# Patient Record
Sex: MALE | Race: WHITE
[De-identification: names, ages, dates, MRNs, and addresses within clinical notes are randomized per-mention and may not be internally consistent; named-entity substitution may affect disease eponyms.]

---

## 2018-07-30 ENCOUNTER — HOSPITAL ENCOUNTER (EMERGENCY)
Dept: HOSPITAL 68 - ERH | Age: 48
End: 2018-07-30
Payer: COMMERCIAL

## 2018-07-30 VITALS — BODY MASS INDEX: 22.73 KG/M2 | WEIGHT: 150 LBS | HEIGHT: 68 IN

## 2018-07-30 VITALS — SYSTOLIC BLOOD PRESSURE: 132 MMHG | DIASTOLIC BLOOD PRESSURE: 79 MMHG

## 2018-07-30 DIAGNOSIS — S46.911A: Primary | ICD-10-CM
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
## 2018-07-30 NOTE — RADIOLOGY REPORT
EXAMINATION:
XR SHOULDER, RIGHT
 
CLINICAL INFORMATION:
Right shoulder pain.
 
COMPARISON:
None
 
TECHNIQUE:
Right shoulder, 3 views
 
FINDINGS:
Alignment is normal. No evidence of inflammatory or degenerative arthropathy
at the glenohumeral or acromioclavicular joints. The humeral head is
well-positioned over the intact glenoid. The acromiohumeral and
coracoclavicular distances are normal. No soft tissue calcification or mass
is seen. The visualized right lung is clear.
 
IMPRESSION:
Normal right shoulder.

## 2024-05-13 NOTE — ED UPPER/LOWER EXTREMITY COMPL
History of Present Illness
 
General
Chief Complaint: Neck/Upper Back Pain/Injury
Stated Complaint: "I FELT SOMETHING POP IN MY BACK LAST NIGHT PAIN"
Source: patient
Exam Limitations: no limitations
 
Vital Signs & Intake/Output
Vital Signs & Intake/Output
 Vital Signs
 
 
Date Time Temp Pulse Resp B/P B/P Pulse O2 O2 Flow FiO2
 
     Mean Ox Delivery Rate 
 
 1336 98.4 88 18 132/79  99   
 
 1136 96.2 65 20 137/71  94 Room Air  
 
 
 
Allergies
Coded Allergies:
NO KNOWN ALLERGIES (12)
 
Reconcile Medications
AMOXICILLIN/POTASSIUM CLAV (Augmentin 875-125 Tablet) 875 MG/125 MG TAB   1 TAB 
PO BID DOG BITE
Cyclobenzaprine HCl 10 MG TABLET   1 TAB PO TID SPASMS
Ibuprofen 800 MG TABLET   1 TAB PO TID PAIN
Naproxen (EC-Naprosyn) 500 MG TABLET.DR   500 MG PO BID PRN back pain
Orphenadrine Citrate (Norflex) 100 MG TER   100 MG PO BID PRN PAIN MODERATE TO 
SEVERE
 
Triage Note:
STATES HE TURNED THE WRONG WAY LAST NIGHT AND FELT
 SOMETHING POP IN HIS RIGHT SHOULDER BLADE AREA.
 DENIES NECK PAIN
Triage Nurses Notes Reviewed? yes
Onset: Abrupt
Duration: day(s):
Timing: recent history
Severity: moderate, severe
Pain/Injury Location:
Right: Shoulder. 
Method of Injury: unknown
HPI:
48-year-old male comes into the emergency room for further evaluation of right 
shoulder pain.  Patient reports that he felt something pop in his right shoulder
when he was in the kitchen the other day and was turning.  He's had pain since 
then with certain movements.  Denies any falls.  Denies any numbness or 
tingling.  He comes in for further evaluation.  He admits to being on Suboxone 
daily for previous opioid addiction.
(Jordan Sorto)
 
Past History
 
Travel History
Traveled to [place] past 21 day No
 
Medical History
Any Pertinent Medical History? see below for history
Neurological: NONE
EENT: NONE
Cardiovascular: NONE
Respiratory: NONE
Gastrointestinal: NONE
Hepatic: NONE
Renal: NONE
Musculoskeletal: NONE
Psychiatric: opioid dependence
Endocrine: NONE
Blood Disorders: NONE
Cancer(s): NONE
GYN/Reproductive: NONE
Tetanus Vaccine: 14
 
Surgical History
Surgical History: N
 
Psychosocial History
What is your primary language English
Tobacco Use: Current Daily Use
Daily Tobacco Use Amount/Type: => 5 Cigarettes daily
ETOH Use: denies use
Illicit Drug Use: denies illicit drug use
 
Family History
Hx Contributory? No
(Jordan Sorto)
 
Review of Systems
 
Review of Systems
Constitutional:
Reports: no symptoms. 
EENTM:
Reports: no symptoms. 
Respiratory:
Reports: no symptoms. 
Cardiovascular:
Reports: no symptoms. 
Gastrointestinal/Abdominal:
Reports: no symptoms. 
Genitourinary:
Reports: no symptoms. 
Musculoskeletal:
Reports: see HPI. 
Skin:
Reports: no symptoms. 
Neurological/Psychological:
Reports: no symptoms. 
Hematologic/Endocrine:
Reports: no symptoms. 
Immunological:
Reports: no symptoms. 
All Other Systems: Reviewed and Negative
(Jordan Sorto)
 
Physical Exam
 
Physical Exam
General Appearance: well developed/nourished, mild distress
Head: atraumatic
Eyes:
Bilateral: normal appearance. 
Ears, Nose, Throat: normal ENT inspection, hearing grossly normal
Neck: normal inspection
Cardiovascular/Respiratory: no respiratory distress
Back: normal inspection
Shoulder Right: soft tissue tenderness (right trapezius), limited range of 
motion, radial pulse 2+,  strength intact, gross sensation intact,
Neurologic/Tendon: normal sensation, normal motor functions, normal tendon 
functions, responds to pain, no evidence tendon injury, no pulse deficit
Skin: intact, normal color, warm/dry
 
Diagram
Shoulders Front/Back
 
  1) 
(Lai JONES,Jordan)
 
Progress
Differential Diagnosis: contusion, dislocation, fracture, sprain, tendon injury,
muscle strain
Plan of Care:
 Orders
 
 
Procedure Date/time Status
 
XRY-SHOULDER COMPLETE-RIGHT  113 Active
 
 
 Current Medications
 
 
  Sig/Garth Start time  Last
 
Medication Dose  Stop Time Status Admin
 
Ketorolac  60 MG ONCE ONE  1145 AC 
 
Tromethamine    1146  
 
(Toradol)     
 
 
 
Diagnostic Imaging:
Viewed by Me: Radiology Read.  Discussed w/RAD: Radiology Read. 
Radiology Impression: PATIENT: GERA HOLLY  MEDICAL RECORD NO: [id number] PRESENT
AGE: 48  PATIENT ACCOUNT NO: [id number] : 71  LOCATION: [facility] ORDERING 
PHYSICIAN: Jordan JONES     SERVICE DATE:  EXAM TYPE: RAD - 
XRY-SHOULDER COMPLETE-RIGHT EXAMINATION: XR SHOULDER, RIGHT CLINICAL INFORMATION
: Right shoulder pain. COMPARISON: None TECHNIQUE: Right shoulder, 3 views 
FINDINGS: Alignment is normal. No evidence of inflammatory or degenerative 
arthropathy at the glenohumeral or acromioclavicular joints. The humeral head is
well-positioned over the intact glenoid. The acromiohumeral and coracoclavicular
distances are normal. No soft tissue calcification or mass is seen. The 
visualized right lung is clear. IMPRESSION: Normal right shoulder. DICTATED BY: 
Romulo Felix MD  DATE/TIME DICTATED:18 :[facility] 
DATE/TIME TRANSCRIBED:18 CONFIDENTIAL, DO NOT COPY WITHOUT 
APPROPRIATE AUTHORIZATION.  <Electronically signed in Other Vendor System>      
                                                                                
SIGNED BY: Romulo Felix MD 18
(Jordan Sorto)
 
Departure
 
Departure
Disposition: HOME OR SELF CARE
Condition: Stable
Clinical Impression
Primary Impression: Muscle strain of right shoulder
Additional Instructions:
Take ibuprofen and Flexeril PRESCRIBED.  Follow-up with your primary care 
doctor.  Return if any concerns worsening symptoms.
 
Please go over all results of today's visit with your primary care doctor.  
Contact your primary care doctor to let them know you were here in the emergency
room.  There may be nonspecific findings which may not be related to your visit 
today here in the emergency room but may require further evaluation and chronic 
monitoring by your primary care doctor.  If you had a laceration today the 
chance of foreign body always remains. You should follow-up with your primary 
care doctor for recheck in 3-5 days for a wound check.  If you had an x-ray done
there is a chance that a fracture could have been missed on initial read and you
should follow-up with your primary care doctor for repeat x-rays if symptoms 
persist.  If your blood pressure was elevated here in the emergency room please 
have rechecked by [facility] primary care doctor within the next 48.  If you were 
prescribed a narcotic here in the emergency room or any type of controlled 
substances you're not allowed to drive while taking this medication or operate 
any type of heavy machinery.  Narcotics can make you feel lightheaded dizziness 
nausea and can cause constipation.  You may need to  a stool softener.  
Thank you for choosing [facility] emergency room.  Please return to the 
emergency room immediately if you have any other concerns worsening of symptoms.
 
Departure Forms:
Customer Survey
General Discharge Information
Prescriptions:
Current Visit Scripts
Ibuprofen 1 TAB PO TID  
     #30 TAB 
 
Cyclobenzaprine HCl 1 TAB PO TID  
     #30 TAB 
 
 
Comments
 
 
2018 3:31:09 PM
 
Patient clinically looks well.  Patient is no apparent distress.  Nontoxic-
appearing.  Pain is reproducible worse with range of motion making the 
consistent with muscular pain.
(Jordan Sorto)
 
PA/NP Co-Sign Statement
Statement:
ED Attending supervision documentation-
 
[] I saw and evaluated the patient. I have also reviewed all the pertinent lab 
results and diagnostic results. I agree with the findings and the plan of care 
as documented in the PA's/NP's documentation. 
 
[X] I have reviewed the ED Record and agree with the PA's/NP's documentation.
 
[] Additions or exceptions (if any) to the PAs/NP's note and plan are 
summarized below:
[]
 
(Earnest Shabazz DO)
fever